# Patient Record
Sex: FEMALE | Race: OTHER | HISPANIC OR LATINO | URBAN - METROPOLITAN AREA
[De-identification: names, ages, dates, MRNs, and addresses within clinical notes are randomized per-mention and may not be internally consistent; named-entity substitution may affect disease eponyms.]

---

## 2017-03-27 ENCOUNTER — EMERGENCY (EMERGENCY)
Facility: HOSPITAL | Age: 37
LOS: 1 days | Discharge: HOME | End: 2017-03-27
Attending: EMERGENCY MEDICINE
Payer: MEDICAID

## 2017-03-27 VITALS
OXYGEN SATURATION: 99 % | TEMPERATURE: 98 F | HEART RATE: 56 BPM | SYSTOLIC BLOOD PRESSURE: 99 MMHG | RESPIRATION RATE: 18 BRPM | DIASTOLIC BLOOD PRESSURE: 72 MMHG

## 2017-03-27 VITALS
OXYGEN SATURATION: 98 % | RESPIRATION RATE: 16 BRPM | HEART RATE: 61 BPM | HEIGHT: 60 IN | WEIGHT: 128.09 LBS | SYSTOLIC BLOOD PRESSURE: 121 MMHG | TEMPERATURE: 98 F | DIASTOLIC BLOOD PRESSURE: 75 MMHG

## 2017-03-27 LAB
ALBUMIN SERPL ELPH-MCNC: 3.8 G/DL — SIGNIFICANT CHANGE UP (ref 3.5–5)
ALP SERPL-CCNC: 63 U/L — SIGNIFICANT CHANGE UP (ref 40–120)
ALT FLD-CCNC: 20 U/L DA — SIGNIFICANT CHANGE UP (ref 10–60)
ANION GAP SERPL CALC-SCNC: 10 MMOL/L — SIGNIFICANT CHANGE UP (ref 5–17)
APPEARANCE UR: CLEAR — SIGNIFICANT CHANGE UP
AST SERPL-CCNC: 18 U/L — SIGNIFICANT CHANGE UP (ref 10–40)
BASOPHILS # BLD AUTO: 0.1 K/UL — SIGNIFICANT CHANGE UP (ref 0–0.2)
BASOPHILS NFR BLD AUTO: 1.2 % — SIGNIFICANT CHANGE UP (ref 0–2)
BILIRUB SERPL-MCNC: 0.2 MG/DL — SIGNIFICANT CHANGE UP (ref 0.2–1.2)
BILIRUB UR-MCNC: NEGATIVE — SIGNIFICANT CHANGE UP
BUN SERPL-MCNC: 17 MG/DL — SIGNIFICANT CHANGE UP (ref 7–18)
CALCIUM SERPL-MCNC: 8.7 MG/DL — SIGNIFICANT CHANGE UP (ref 8.4–10.5)
CHLORIDE SERPL-SCNC: 107 MMOL/L — SIGNIFICANT CHANGE UP (ref 96–108)
CO2 SERPL-SCNC: 24 MMOL/L — SIGNIFICANT CHANGE UP (ref 22–31)
COLOR SPEC: YELLOW — SIGNIFICANT CHANGE UP
CREAT SERPL-MCNC: 0.76 MG/DL — SIGNIFICANT CHANGE UP (ref 0.5–1.3)
DIFF PNL FLD: ABNORMAL
EOSINOPHIL # BLD AUTO: 0.8 K/UL — HIGH (ref 0–0.5)
EOSINOPHIL NFR BLD AUTO: 11.8 % — HIGH (ref 0–6)
GLUCOSE SERPL-MCNC: 79 MG/DL — SIGNIFICANT CHANGE UP (ref 70–99)
GLUCOSE UR QL: NEGATIVE — SIGNIFICANT CHANGE UP
HCG UR QL: NEGATIVE — SIGNIFICANT CHANGE UP
HCT VFR BLD CALC: 37.2 % — SIGNIFICANT CHANGE UP (ref 34.5–45)
HGB BLD-MCNC: 12.6 G/DL — SIGNIFICANT CHANGE UP (ref 11.5–15.5)
KETONES UR-MCNC: NEGATIVE — SIGNIFICANT CHANGE UP
LEUKOCYTE ESTERASE UR-ACNC: ABNORMAL
LIDOCAIN IGE QN: 143 U/L — SIGNIFICANT CHANGE UP (ref 73–393)
LYMPHOCYTES # BLD AUTO: 2.3 K/UL — SIGNIFICANT CHANGE UP (ref 1–3.3)
LYMPHOCYTES # BLD AUTO: 32 % — SIGNIFICANT CHANGE UP (ref 13–44)
MCHC RBC-ENTMCNC: 29 PG — SIGNIFICANT CHANGE UP (ref 27–34)
MCHC RBC-ENTMCNC: 34 GM/DL — SIGNIFICANT CHANGE UP (ref 32–36)
MCV RBC AUTO: 85.3 FL — SIGNIFICANT CHANGE UP (ref 80–100)
MONOCYTES # BLD AUTO: 0.5 K/UL — SIGNIFICANT CHANGE UP (ref 0–0.9)
MONOCYTES NFR BLD AUTO: 7.3 % — SIGNIFICANT CHANGE UP (ref 2–14)
NEUTROPHILS # BLD AUTO: 3.4 K/UL — SIGNIFICANT CHANGE UP (ref 1.8–7.4)
NEUTROPHILS NFR BLD AUTO: 47.7 % — SIGNIFICANT CHANGE UP (ref 43–77)
NITRITE UR-MCNC: NEGATIVE — SIGNIFICANT CHANGE UP
PH UR: 5 — SIGNIFICANT CHANGE UP (ref 4.8–8)
PLATELET # BLD AUTO: 200 K/UL — SIGNIFICANT CHANGE UP (ref 150–400)
POTASSIUM SERPL-MCNC: 4 MMOL/L — SIGNIFICANT CHANGE UP (ref 3.5–5.3)
POTASSIUM SERPL-SCNC: 4 MMOL/L — SIGNIFICANT CHANGE UP (ref 3.5–5.3)
PROT SERPL-MCNC: 7.4 G/DL — SIGNIFICANT CHANGE UP (ref 6–8.3)
PROT UR-MCNC: NEGATIVE — SIGNIFICANT CHANGE UP
RBC # BLD: 4.36 M/UL — SIGNIFICANT CHANGE UP (ref 3.8–5.2)
RBC # FLD: 13.5 % — SIGNIFICANT CHANGE UP (ref 10.3–14.5)
SODIUM SERPL-SCNC: 141 MMOL/L — SIGNIFICANT CHANGE UP (ref 135–145)
SP GR SPEC: 1.01 — SIGNIFICANT CHANGE UP (ref 1.01–1.02)
UROBILINOGEN FLD QL: NEGATIVE — SIGNIFICANT CHANGE UP
WBC # BLD: 7.1 K/UL — SIGNIFICANT CHANGE UP (ref 3.8–10.5)
WBC # FLD AUTO: 7.1 K/UL — SIGNIFICANT CHANGE UP (ref 3.8–10.5)

## 2017-03-27 PROCEDURE — 80053 COMPREHEN METABOLIC PANEL: CPT

## 2017-03-27 PROCEDURE — 99284 EMERGENCY DEPT VISIT MOD MDM: CPT | Mod: 25

## 2017-03-27 PROCEDURE — 96375 TX/PRO/DX INJ NEW DRUG ADDON: CPT

## 2017-03-27 PROCEDURE — 81025 URINE PREGNANCY TEST: CPT

## 2017-03-27 PROCEDURE — 83690 ASSAY OF LIPASE: CPT

## 2017-03-27 PROCEDURE — 85027 COMPLETE CBC AUTOMATED: CPT

## 2017-03-27 PROCEDURE — 96374 THER/PROPH/DIAG INJ IV PUSH: CPT

## 2017-03-27 PROCEDURE — 99285 EMERGENCY DEPT VISIT HI MDM: CPT

## 2017-03-27 PROCEDURE — 81001 URINALYSIS AUTO W/SCOPE: CPT

## 2017-03-27 RX ORDER — FAMOTIDINE 10 MG/ML
20 INJECTION INTRAVENOUS ONCE
Qty: 0 | Refills: 0 | Status: COMPLETED | OUTPATIENT
Start: 2017-03-27 | End: 2017-03-27

## 2017-03-27 RX ORDER — ONDANSETRON 8 MG/1
4 TABLET, FILM COATED ORAL ONCE
Qty: 0 | Refills: 0 | Status: COMPLETED | OUTPATIENT
Start: 2017-03-27 | End: 2017-03-27

## 2017-03-27 RX ORDER — FAMOTIDINE 10 MG/ML
1 INJECTION INTRAVENOUS
Qty: 30 | Refills: 0 | OUTPATIENT
Start: 2017-03-27

## 2017-03-27 RX ORDER — SODIUM CHLORIDE 9 MG/ML
1000 INJECTION INTRAMUSCULAR; INTRAVENOUS; SUBCUTANEOUS ONCE
Qty: 0 | Refills: 0 | Status: COMPLETED | OUTPATIENT
Start: 2017-03-27 | End: 2017-03-27

## 2017-03-27 RX ADMIN — ONDANSETRON 4 MILLIGRAM(S): 8 TABLET, FILM COATED ORAL at 08:51

## 2017-03-27 RX ADMIN — SODIUM CHLORIDE 1000 MILLILITER(S): 9 INJECTION INTRAMUSCULAR; INTRAVENOUS; SUBCUTANEOUS at 08:52

## 2017-03-27 RX ADMIN — FAMOTIDINE 20 MILLIGRAM(S): 10 INJECTION INTRAVENOUS at 08:51

## 2017-03-27 NOTE — ED PROVIDER NOTE - NS ED MD SCRIBE ATTENDING SCRIBE SECTIONS
HISTORY OF PRESENT ILLNESS/PHYSICAL EXAM/HIV/DISPOSITION/REVIEW OF SYSTEMS/VITAL SIGNS( Pullset)/PAST MEDICAL/SURGICAL/SOCIAL HISTORY

## 2017-03-27 NOTE — ED PROVIDER NOTE - OBJECTIVE STATEMENT
: [id number]  37 y/o F pt with no PMHx presents to the ED c/o epigastric pain x 1 week. Pt also notes nausea. Pt denies vomiting, diarrhea, urinary sx or any other complaints at this time. NKDA.

## 2017-03-27 NOTE — ED PROVIDER NOTE - MEDICAL DECISION MAKING DETAILS
Pt with abdominal pain and nausea. Concern for gastric vs pancreatitis. Plan to obtain labs, UA, treat with pepcid + zofran and reassess.

## 2017-03-31 DIAGNOSIS — R10.9 UNSPECIFIED ABDOMINAL PAIN: ICD-10-CM

## 2017-03-31 DIAGNOSIS — R11.0 NAUSEA: ICD-10-CM

## 2019-06-11 NOTE — ED PROVIDER NOTE - HISTORY ATTESTATION, MLM
I have reviewed and confirmed nurses' notes...
nontender/no masses palpable/no distention/normal/bowel sounds normal/soft

## 2020-05-18 NOTE — ED ADULT NURSE NOTE - CARDIO WDL
Normal rate, regular rhythm, normal S1, S2 heart sounds heard.
no myalgia/no arthralgia/no joint swelling/no muscle cramps

## 2024-08-07 NOTE — ED PROVIDER NOTE - NS PRO PASSIVE SMOKE EXP
CPM/PAT Evaluation       Name: Macy Chavez (Macy Chavez)  /Age: 1984/40 y.o.     In-Person       Chief Complaint: breast cancer    HPI  This is a very pleasant 41 yo with Pmhx of HLD and breast cancer.  Pt had biopsy on 2024.  Biopsy + for invasive ductal carcinoma with + right lymph node.  Pt completed course of chemo on .  Plan now is for  bilateral mastectomy with right Magseed localized sentinel lymph node biopsy,  and breast reconstruction immediate with implant bilateral.    Patient denies recent fever or chills.    Past Medical History:   Diagnosis Date    Breast cancer (Multi)     Cancer (Multi)     Chest tightness 2024    Hyperlipidemia     Other specified health status 2016    No pertinent past medical history    Shortness of breath 2024       Past Surgical History:   Procedure Laterality Date    OTHER SURGICAL HISTORY  2016    Oral Surgery Tooth Extraction Jewett Tooth    WISDOM TOOTH EXTRACTION  2000       Patient  reports being sexually active and has had partner(s) who are male. She reports using the following method of birth control/protection: I.U.D..    Family History   Problem Relation Name Age of Onset    Stroke Father Jad     Other (cancer) Maternal Grandfather Sotero         liver or kidney    Cancer Paternal Grandmother Lizbeth         carcinoma of thigh    Stroke Paternal Grandmother Lizbeth     COPD Paternal Grandfather Mitchell        Allergies   Allergen Reactions    Dog Dander Itching       Prior to Admission medications    Medication Sig Start Date End Date Taking? Authorizing Provider   atorvastatin (Lipitor) 20 mg tablet Take 1 tablet (20 mg) by mouth once daily. 3/6/24 3/6/25  Zainab Larson MD   b complex 0.4 mg tablet Take 1 tablet by mouth once daily.    Historical Provider, MD   lidocaine-prilocaine (Emla) 2.5-2.5 % cream PRN 24   Historical Provider, MD   loratadine (CLARITIN ORAL) Take by mouth once daily.    Historical Provider, MD    metoprolol succinate XL (Toprol-XL) 25 mg 24 hr tablet Take 1 tablet (25 mg) by mouth once daily. Do not crush or chew. 5/24/24 5/24/25  Zainab Larson MD   nitroglycerin (Nitro-Bid) 2 % ointment Apply layer to skin of breast and/or nipples daily after surgery. Cover with gauze. Change dressings daily. 7/15/24   Mundo Mendoza MD   prochlorperazine (Compazine) 10 mg tablet Take 1 tablet (10 mg) by mouth every 6 hours if needed for nausea or vomiting.  Patient not taking: Reported on 8/6/2024 3/27/24   Denise Golden MD   valACYclovir (Valtrex) 500 mg tablet Take 1 tablet (500 mg) by mouth once daily. 5/28/24   Denise Golden MD        PAT ROS:   Constitutional:   neg    Neuro/Psych:   neg    Eyes:   neg     use of corrective lenses  Ears:   neg    Nose:   neg    Mouth:   neg    Throat:   neg    Neck:   neg    Cardio:    Recent repeat ECHO with Dr Larson currently no CP or SOB  Respiratory:   neg    Endocrine:   neg    GI:   neg    :    See HPI breast cancer  Musculoskeletal:   neg    Hematologic:   neg    Skin:   Pt has mediport on right side  neg        Physical Exam  Constitutional:       Appearance: Normal appearance.   HENT:      Head: Normocephalic and atraumatic.      Nose: Nose normal.      Mouth/Throat:      Mouth: Mucous membranes are moist.   Eyes:      Pupils: Pupils are equal, round, and reactive to light.   Cardiovascular:      Rate and Rhythm: Normal rate and regular rhythm.   Pulmonary:      Effort: Pulmonary effort is normal.      Breath sounds: Normal breath sounds.   Abdominal:      General: Bowel sounds are normal.      Palpations: Abdomen is soft.   Musculoskeletal:      Cervical back: Normal range of motion.      Comments: No LE   Skin:     General: Skin is warm and dry.   Neurological:      General: No focal deficit present.      Mental Status: She is alert and oriented to person, place, and time.   Psychiatric:         Mood and Affect: Mood normal.         Behavior: Behavior  normal.        Airway: nl ROM  Anesthesia:  Patient denies any anesthesia complications.   Teeth: intact    Lab Results   Component Value Date    GLUCOSE 96 08/07/2024    CALCIUM 10.1 08/07/2024     08/07/2024    K 4.4 08/07/2024    CO2 29 08/07/2024     08/07/2024    BUN 15 08/07/2024    CREATININE 0.86 08/07/2024     Lab Results   Component Value Date    WBC 8.1 08/07/2024    HGB 14.1 08/07/2024    HCT 42.0 08/07/2024    MCV 95 08/07/2024     08/07/2024       ECHO-8/2   CONCLUSIONS:      1. Left ventricular ejection fraction is normal, by visual estimate at 60%.   2. There is no evidence of left ventricular hypertrophy.   3. There is normal right ventricular global systolic function.   4. Normal LV global strain.   5. No comparison study available.   6. No significant valvular heart disease noted.   7. Normal chamber sizes.     Visit Vitals  /85   Pulse 87   Temp 36.4 °C (97.5 °F) (Tympanic)   Resp 16       DASI Risk Score      Flowsheet Row Most Recent Value   DASI SCORE 58.2   METS Score (Will be calculated only when all the questions are answered) 9.9          Caprini DVT Assessment      Flowsheet Row Most Recent Value   DVT Score 7   Current Status Major surgery planned, lasting 2-3 hours, Present cancer or chemotherapy   Age 40-59 years   BMI 30 or less          Modified Frailty Index      Flowsheet Row Most Recent Value   Modified Frailty Index Calculator 0          CHADS2 Stroke Risk  Current as of about an hour ago        N/A 3 to 100%: High Risk   2 to < 3%: Medium Risk   0 to < 2%: Low Risk     Last Change: N/A          This score determines the patient's risk of having a stroke if the patient has atrial fibrillation.        This score is not applicable to this patient. Components are not calculated.          Revised Cardiac Risk Index      Flowsheet Row Most Recent Value   Revised Cardiac Risk Calculator 0          Apfel Simplified Score      Flowsheet Row Most Recent Value    Apfel Simplified Score Calculator 4          Risk Analysis Index Results This Encounter         8/6/2024  1025             ORELLANA Cancer History: Patient indicates history of cancer    Total Risk Analysis Index Score Without Cancer: 10    Total Risk Analysis Index Score: 31          Stop Bang Score      Flowsheet Row Most Recent Value   Do you snore loudly? 1   Do you often feel tired or fatigued after your sleep? 0   Has anyone ever observed you stop breathing in your sleep? 0   Do you have or are you being treated for high blood pressure? 0   Recent BMI (Calculated) 23.6   Is BMI greater than 35 kg/m2? 0=No   Age older than 50 years old? 0=No   Is your neck circumference greater than 17 inches (Male) or 16 inches (Female)? 0   Gender - Male 0=No   STOP-BANG Total Score 1            Assessment and Plan: Plan for OR on 8/14 BILATERAL NIPPLE SPARING MASTECTOMY WITH RIGHT MAG SEED LOCALIZED SENTINEL LYMPH NODE BIOPSY POSSIBLE AXILLARY NODE DISSECTION [93926 (CPT®)]   CBC BMP update     Cardiotoxic drug therapy- follows with Dr Larson most recent ECHO enclosed       No